# Patient Record
Sex: MALE
[De-identification: names, ages, dates, MRNs, and addresses within clinical notes are randomized per-mention and may not be internally consistent; named-entity substitution may affect disease eponyms.]

---

## 2021-06-28 ENCOUNTER — HOSPITAL ENCOUNTER (EMERGENCY)
Dept: HOSPITAL 97 - ER | Age: 56
Discharge: HOME | End: 2021-06-28
Payer: COMMERCIAL

## 2021-06-28 VITALS — DIASTOLIC BLOOD PRESSURE: 88 MMHG | SYSTOLIC BLOOD PRESSURE: 134 MMHG

## 2021-06-28 VITALS — OXYGEN SATURATION: 99 %

## 2021-06-28 VITALS — TEMPERATURE: 98.1 F

## 2021-06-28 DIAGNOSIS — R52: Primary | ICD-10-CM

## 2021-06-28 DIAGNOSIS — I10: ICD-10-CM

## 2021-06-28 LAB
ALBUMIN SERPL BCP-MCNC: 3.8 G/DL (ref 3.4–5)
ALP SERPL-CCNC: 166 U/L (ref 45–117)
ALT SERPL W P-5'-P-CCNC: 53 U/L (ref 12–78)
AST SERPL W P-5'-P-CCNC: 25 U/L (ref 15–37)
BUN BLD-MCNC: 17 MG/DL (ref 7–18)
GLUCOSE SERPLBLD-MCNC: 92 MG/DL (ref 74–106)
HCT VFR BLD CALC: 44.5 % (ref 39.6–49)
LYMPHOCYTES # SPEC AUTO: 2.6 K/UL (ref 0.7–4.9)
MAGNESIUM SERPL-MCNC: 2.3 MG/DL (ref 1.8–2.4)
NT-PROBNP SERPL-MCNC: 102 PG/ML (ref ?–125)
PMV BLD: 9.7 FL (ref 7.6–11.3)
POTASSIUM SERPL-SCNC: 3.5 MMOL/L (ref 3.5–5.1)
RBC # BLD: 5.04 M/UL (ref 4.33–5.43)
TROPONIN (EMERG DEPT USE ONLY): < 0.02 NG/ML (ref 0–0.04)

## 2021-06-28 PROCEDURE — 80076 HEPATIC FUNCTION PANEL: CPT

## 2021-06-28 PROCEDURE — 82947 ASSAY GLUCOSE BLOOD QUANT: CPT

## 2021-06-28 PROCEDURE — 82550 ASSAY OF CK (CPK): CPT

## 2021-06-28 PROCEDURE — 85025 COMPLETE CBC W/AUTO DIFF WBC: CPT

## 2021-06-28 PROCEDURE — 93005 ELECTROCARDIOGRAM TRACING: CPT

## 2021-06-28 PROCEDURE — 96360 HYDRATION IV INFUSION INIT: CPT

## 2021-06-28 PROCEDURE — 83735 ASSAY OF MAGNESIUM: CPT

## 2021-06-28 PROCEDURE — 99284 EMERGENCY DEPT VISIT MOD MDM: CPT

## 2021-06-28 PROCEDURE — 84484 ASSAY OF TROPONIN QUANT: CPT

## 2021-06-28 PROCEDURE — 83880 ASSAY OF NATRIURETIC PEPTIDE: CPT

## 2021-06-28 PROCEDURE — 80048 BASIC METABOLIC PNL TOTAL CA: CPT

## 2021-06-28 PROCEDURE — 36415 COLL VENOUS BLD VENIPUNCTURE: CPT

## 2021-06-28 NOTE — ER
Nurse's Notes                                                                                     

 [facility]                                                                 

Name: Petar Hoang                                                                               

Age: 55 yrs                                                                                       

Sex: Male                                                                                         

: 1965                                                                                   

MRN: [id number]                                                                                   

Arrival Date: 2021                                                                          

Time: 09:33                                                                                       

Account#: [id number]                                                                            

Bed 2                                                                                             

Private MD:                                                                                       

Diagnosis: Pain, unspecified                                                                      

                                                                                                  

Presentation:                                                                                     

                                                                                             

09:34 Chief complaint: EMS states: Pain all over since last night. Denies other symptoms. BP  hb  

      120/80, HR 82s, BGL 69. Coronavirus screen: At this time, the client does not indicate      

      any symptoms associated with coronavirus-19. Ebola Screen: No symptoms or risks             

      identified at this time. Initial Sepsis Screen: Does the patient meet any 2 criteria?       

      No. Patient's initial sepsis screen is negative. Does the patient have a suspected          

      source of infection? No. Patient's initial sepsis screen is negative. Risk Assessment:      

      Do you want to hurt yourself or someone else? Patient reports no desire to harm self or     

      others. Onset of symptoms was 2021.                                                

09:34 Method Of Arrival: EMS: [facility]                                                      

09:34 Acuity: CHAYO 3                                                                           hb  

                                                                                                  

Triage Assessment:                                                                                

09:35 General: Appears in no apparent distress. Behavior is calm, cooperative. Pain: Pain     hb  

      currently is 4 out of 10 on a pain scale. EENT: No signs and/or symptoms were reported      

      regarding the EENT system. Neuro: Level of Consciousness is awake, alert, obeys             

      commands, Oriented to person, place, time, situation. Cardiovascular: Patient's skin is     

      warm and dry. Respiratory: Respiratory effort is even, unlabored, Respiratory pattern       

      is regular, symmetrical. GI: No signs and/or symptoms were reported involving the           

      gastrointestinal system. : No signs and/or symptoms were reported regarding the           

      genitourinary system. Derm: Skin is pink, warm \T\ dry. Musculoskeletal: Reports "pain      

      all over, mostly in back".                                                                  

                                                                                                  

Historical:                                                                                       

- Allergies:                                                                                      

:35 No Known Allergies;                                                                     hb  

- Home Meds:                                                                                      

:35 amlodipine oral [Active];                                                               hb  

- PMHx:                                                                                           

:35 Hypertension;                                                                           hb  

- PSHx:                                                                                           

:35 None;                                                                                   hb  

                                                                                                  

- Immunization history:: Adult Immunizations up to date, Client reports receiving the             

  2nd dose of the Covid vaccine.                                                                  

- Social history:: Smoking status: Patient denies any tobacco usage or history of.                

- Family history:: not pertinent.                                                                 

- Hospitalizations: : No recent hospitalization is reported.                                      

                                                                                                  

                                                                                                  

Screenin:36 Abuse screen: Denies threats or abuse. Denies injuries from another. Nutritional        hb  

      screening: No deficits noted. Tuberculosis screening: No symptoms or risk factors           

      identified. Fall Risk None identified.                                                      

                                                                                                  

Assessment:                                                                                       

09:36 General: see triage assessment .                                                        hb  

10:30 Reassessment: Patient appears in no apparent distress at this time. Patient and/or      hb  

      family updated on plan of care and expected duration. Pain level reassessed. Patient is     

      alert, oriented x 3, equal unlabored respirations, skin warm/dry/pink.                      

11:30 Reassessment: Patient appears in no apparent distress at this time. Patient and/or      hb  

      family updated on plan of care and expected duration. Pain level reassessed. Patient is     

      alert, oriented x 3, equal unlabored respirations, skin warm/dry/pink.                      

12:32 Reassessment: Patient appears in no apparent distress at this time. Patient and/or      hb  

      family updated on plan of care and expected duration. Pain level reassessed. Patient is     

      alert, oriented x 3, equal unlabored respirations, skin warm/dry/pink.                      

                                                                                                  

Vital Signs:                                                                                      

09:34  / 83; Pulse 67; Resp 16; Temp 98.1; Pulse Ox 100% on R/A; Pain 4/10;             hb  

11:00  / 100; Pulse 78; Resp 15; Pulse Ox 99% on R/A;                                   hb  

12:35  / 88; Pulse 74; Resp 16; Pulse Ox 99% on R/A;                                    hb  

                                                                                                  

ED Course:                                                                                        

09:33 Patient arrived in ED.                                                                  hb  

09:35 Triage completed.                                                                       hb  

09:35 Arm band placed on.                                                                     hb  

09:36 Patient has correct armband on for positive identification. Bed in low position. Call   hb  

      light in reach.                                                                             

09:41 Jf Flores MD is Attending Physician.                                                rn  

10:00 Inserted saline lock: 20 gauge in right antecubital area, using aseptic technique.      hb  

      Blood collected.                                                                            

10:04 Melania Lancaster, RN is Primary Nurse.                                                   hb  

12:52 No provider procedures requiring assistance completed. IV discontinued, intact,         hb  

      bleeding controlled, No redness/swelling at site.                                           

                                                                                                  

Administered Medications:                                                                         

10:02 Drug: NS 0.9% 1000 ml Route: IV; Rate: 1000 ml; Site: right antecubital;                hb  

11:00 Follow up: Response: No adverse reaction; IV Status: Completed infusion; IV Intake:     hb  

      1000ml                                                                                      

                                                                                                  

                                                                                                  

Intake:                                                                                           

11:00 IV: 1000ml; Total: 1000ml.                                                              hb  

                                                                                                  

Outcome:                                                                                          

12:09 Discharge ordered by MD.                                                                rn  

12:52 Discharged to home ambulatory.                                                          hb  

12:52 Condition: stable                                                                           

12:52 Discharge instructions given to patient, Instructed on discharge instructions, follow       

      up and referral plans. medication usage, Demonstrated understanding of instructions,        

      follow-up care, medications.                                                                

13:10 Patient left the ED.                                                                    hb  

                                                                                                  

Signatures:                                                                                       

Jf Flores MD MD   rn                                                   

Melania Lancaster RN                     RN   hb                                                   

                                                                                                  

**************************************************************************************************

## 2021-06-28 NOTE — EDPHYS
Physician Documentation                                                                           

 [facility]                                                                 

Name: Petar Hoang                                                                               

Age: 55 yrs                                                                                       

Sex: Male                                                                                         

: 1965                                                                                   

MRN: [id number]                                                                                   

Arrival Date: 2021                                                                          

Time: 09:33                                                                                       

Account#: [id number]                                                                            

Bed 2                                                                                             

Private MD:                                                                                       

ED Physician Jf Flores                                                                         

HPI:                                                                                              

                                                                                             

12:04 This 55 yrs old  Male presents to ER via EMS with complaints of Pain All Over.  rn  

12:04 Onset: The symptoms/episode began/occurred yesterday. Severity of symptoms: At their    rn  

      worst the symptoms were moderate in the emergency department the symptoms are               

      unchanged. The patient has not experienced similar symptoms in the past. The patient        

      has not recently seen a physician.                                                          

12:05 Reports began last night with "aches and pains". No fever, no chills. No chest          rn  

      pain/sob/abd pain/vomiting/diarrhea. Reports has had similar aches and attributes them      

      to being 55 years old. No known rheumatologic problems. No changes in medication. Does      

      not work outside or feel like over-did it. No supplements. .                                

                                                                                                  

Historical:                                                                                       

- Allergies:                                                                                      

09:35 No Known Allergies;                                                                     hb  

- Home Meds:                                                                                      

09:35 amlodipine oral [Active];                                                               hb  

- PMHx:                                                                                           

09:35 Hypertension;                                                                           hb  

- PSHx:                                                                                           

09:35 None;                                                                                   hb  

                                                                                                  

- Immunization history:: Adult Immunizations up to date, Client reports receiving the             

  2nd dose of the Covid vaccine.                                                                  

- Social history:: Smoking status: Patient denies any tobacco usage or history of.                

- Family history:: not pertinent.                                                                 

- Hospitalizations: : No recent hospitalization is reported.                                      

                                                                                                  

                                                                                                  

ROS:                                                                                              

12:05 Constitutional: Negative for fever, chills, and weight loss, Eyes: Negative for injury, rn  

      pain, redness, and discharge, Neck: Negative for injury, pain, and swelling,                

      Cardiovascular: Negative for chest pain, palpitations, and edema, Respiratory: Negative     

      for shortness of breath, cough, wheezing, and pleuritic chest pain, Abdomen/GI:             

      Negative for abdominal pain, nausea, vomiting, diarrhea, and constipation, Back:            

      Negative for injury and pain, MS/Extremity: Negative for injury and deformity, Skin:        

      Negative for injury, rash, and discoloration, Neuro: Negative for headache, weakness,       

      numbness, tingling, and seizure.                                                            

                                                                                                  

Exam:                                                                                             

11:42 ECG was reviewed by the Attending Physician.                                            rn  

12:05 Constitutional:  This is a well developed, well nourished patient who is awake, alert,  rn  

      and in no acute distress. Head/Face:  Normocephalic, atraumatic. Eyes:  Pupils equal        

      round and reactive to light, extra-ocular motions intact.  Lids and lashes normal.          

      Conjunctiva and sclera are non-icteric and not injected.  Cornea within normal limits.      

      Periorbital areas with no swelling, redness, or edema. ENT:  MMM Cardiovascular:            

      Regular rate and rhythm.  No pulse deficits. Respiratory:  No increased work of             

      breathing, no retractions or nasal flaring. Abdomen/GI:  soft, non-tender Skin:  Warm,      

      dry with normal turgor.  Normal color with no rashes, no lesions, and no evidence of        

      cellulitis. MS/ Extremity:  Pulses equal, no cyanosis.  Neurovascular intact.  Full,        

      normal range of motion.  Equal circumference. Neuro:  Awake and alert, GCS 15, oriented     

      to person, place, time, and situation.  Cranial nerves II-XII grossly intact.  Motor        

      strength 5/5 in all extremities.  Sensory grossly intact.  Cerebellar exam normal.          

                                                                                                  

Vital Signs:                                                                                      

09:34  / 83; Pulse 67; Resp 16; Temp 98.1; Pulse Ox 100% on R/A; Pain 4/10;             hb  

11:00  / 100; Pulse 78; Resp 15; Pulse Ox 99% on R/A;                                   hb  

12:35  / 88; Pulse 74; Resp 16; Pulse Ox 99% on R/A;                                    hb  

                                                                                                  

MDM:                                                                                              

09:41 Patient medically screened.                                                             rn  

12:05 Differential Diagnosis viral syndrome, arthritis, rhabdomyolysis, electrolyte disorder. rn  

      . Data reviewed: vital signs, nurses notes, lab test result(s), EKG, and as a result, I     

      will discharge patient. Counseling: I had a detailed discussion with the patient and/or     

      guardian regarding: the historical points, exam findings, and any diagnostic results        

      supporting the discharge/admit diagnosis, lab results, the need for outpatient follow       

      up, to return to the emergency department if symptoms worsen or persist or if there are     

      any questions or concerns that arise at home. Response to treatment: the patient's          

      symptoms have mildly improved after treatment, and as a result, I will discharge            

      patient. Special discussion: I discussed with the patient/guardian in detail that at        

      this point there is no indication for admission to the hospital. It is understood,          

      however, that if the symptoms persist or worsen the patient needs to return immediately     

      for re-evaluation.                                                                          

                                                                                                  

                                                                                             

09:41 Order name: Basic Metabolic Panel; Complete Time: 11:41                                 rn  

                                                                                             

09:41 Order name: CBC with Diff; Complete Time: 11:41                                         rn  

                                                                                             

09:41 Order name: LFT's; Complete Time: 11:41                                                 rn  

                                                                                             

09:41 Order name: Magnesium; Complete Time: 11:41                                             rn  

                                                                                             

09:41 Order name: NT PRO-BNP; Complete Time: 11:41                                            rn  

                                                                                             

09:41 Order name: Troponin (emerg Dept Use Only); Complete Time: 11:                        rn  

                                                                                             

09:41 Order name: EKG; Complete Time: 09:42                                                   rn  

                                                                                             

09:41 Order name: Cardiac monitoring; Complete Time: 10:05                                    rn  

                                                                                             

09:41 Order name: EKG - Nurse/Tech; Complete Time: 10:05                                      rn  

                                                                                             

09:41 Order name: IV Saline Lock; Complete Time: 10:                                        rn  

                                                                                             

09:41 Order name: Labs collected and sent; Complete Time: 10:05                               rn  

                                                                                             

09:41 Order name: CK; Complete Time: 11:41                                                    rn  

                                                                                             

10:10 Order name: Glucose, Ancillary Testing; Complete Time: 11:41                            EDMS

                                                                                             

09:41 Order name: O2 Per Protocol; Complete Time: 10:05                                       rn  

                                                                                             

09:41 Order name: O2 Sat Monitoring; Complete Time: 10:05                                     rn  

                                                                                             

09:41 Order name: Glucose Level; Complete Time: 10:05                                         rn  

                                                                                                  

EC:42 Rate is 71 beats/min. Rhythm is regular. QRS Axis is Normal. VT interval is normal. QRS rn  

      interval is normal. QT interval is normal. No Q waves. T waves are Normal. No ST            

      changes noted. Clinical impression: Normal ECG. Interpreted by me. Reviewed by me.          

                                                                                                  

Administered Medications:                                                                         

10:02 Drug: NS 0.9% 1000 ml Route: IV; Rate: 1000 ml; Site: right antecubital;                hb  

11:00 Follow up: Response: No adverse reaction; IV Status: Completed infusion; IV Intake:     hb  

      1000ml                                                                                      

                                                                                                  

                                                                                                  

Disposition:                                                                                      

21 12:09 Discharged to Home. Impression: Pain, unspecified.                                 

- Condition is Stable.                                                                            

- Discharge Instructions: Pain Without a Known Cause.                                             

                                                                                                  

- Medication Reconciliation Form, Thank You Letter, Antibiotic Education, Prescription            

  Opioid Use, Work release form form.                                                             

- Follow up: Private Physician; When: As needed; Reason: Recheck today's complaints,              

  Re-evaluation by your physician.                                                                

- Problem is new.                                                                                 

- Symptoms have improved.                                                                         

                                                                                                  

                                                                                                  

                                                                                                  

Signatures:                                                                                       

Dispatcher [employer]                           EDMS                                                 

Jf Flores MD MD rn Baxter, Heather, RN RN                                                      

                                                                                                  

Corrections: (The following items were deleted from the chart)                                    

13:10 12:09 2021 12:09 Discharged to Home. Impression: Pain, unspecified. Condition is  hb  

      Stable. Forms are Medication Reconciliation Form, Thank You Letter, Antibiotic              

      Education, Prescription Opioid Use. Follow up: Private Physician; When: As needed;          

      Reason: Recheck today's complaints, Re-evaluation by your physician. Problem is new.        

      Symptoms have improved. rn                                                                  

                                                                                                  

**************************************************************************************************

## 2021-06-28 NOTE — EKG
Test Date:    2021-06-28               Test Time:    09:53:39

Technician:   SV                                     

                                                     

MEASUREMENT RESULTS:                                       

Intervals:                                           

Rate:         71                                     

TX:           144                                    

QRSD:         86                                     

QT:           408                                    

QTc:          443                                    

Axis:                                                

P:            20                                     

TX:           144                                    

QRS:          17                                     

T:            -1                                     

                                                     

INTERPRETIVE STATEMENTS:                                       

                                                     

Normal sinus rhythm

Normal ECG

No previous ECG available for comparison



Electronically Signed On 06-28-21 15:46:52 CDT by Micah Latham